# Patient Record
Sex: FEMALE | Race: WHITE | NOT HISPANIC OR LATINO | URBAN - METROPOLITAN AREA
[De-identification: names, ages, dates, MRNs, and addresses within clinical notes are randomized per-mention and may not be internally consistent; named-entity substitution may affect disease eponyms.]

---

## 2018-01-29 ENCOUNTER — EMERGENCY (EMERGENCY)
Facility: HOSPITAL | Age: 78
LOS: 0 days | Discharge: HOME | End: 2018-01-29
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
## 2018-01-29 DIAGNOSIS — Z79.899 OTHER LONG TERM (CURRENT) DRUG THERAPY: ICD-10-CM

## 2018-01-29 DIAGNOSIS — S80.02XA CONTUSION OF LEFT KNEE, INITIAL ENCOUNTER: ICD-10-CM

## 2018-01-29 DIAGNOSIS — S81.011A LACERATION WITHOUT FOREIGN BODY, RIGHT KNEE, INITIAL ENCOUNTER: ICD-10-CM

## 2018-01-29 DIAGNOSIS — Z88.0 ALLERGY STATUS TO PENICILLIN: ICD-10-CM

## 2018-01-29 DIAGNOSIS — I10 ESSENTIAL (PRIMARY) HYPERTENSION: ICD-10-CM

## 2018-01-29 DIAGNOSIS — L03.119 CELLULITIS OF UNSPECIFIED PART OF LIMB: ICD-10-CM
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
## 2018-01-29 DIAGNOSIS — S40.012A CONTUSION OF LEFT SHOULDER, INITIAL ENCOUNTER: ICD-10-CM

## 2018-01-29 DIAGNOSIS — Z23 ENCOUNTER FOR IMMUNIZATION: ICD-10-CM

## 2018-01-29 DIAGNOSIS — S49.92XA UNSPECIFIED INJURY OF LEFT SHOULDER AND UPPER ARM, INITIAL ENCOUNTER: ICD-10-CM

## 2018-01-29 DIAGNOSIS — Z96.653 PRESENCE OF ARTIFICIAL KNEE JOINT, BILATERAL: ICD-10-CM
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
## 2018-09-11 ENCOUNTER — EMERGENCY (EMERGENCY)
Facility: HOSPITAL | Age: 78
LOS: 0 days | Discharge: HOME | End: 2018-09-11
Attending: EMERGENCY MEDICINE | Admitting: EMERGENCY MEDICINE

## 2018-09-11 VITALS
HEART RATE: 95 BPM | RESPIRATION RATE: 20 BRPM | OXYGEN SATURATION: 96 % | TEMPERATURE: 96 F | SYSTOLIC BLOOD PRESSURE: 146 MMHG | DIASTOLIC BLOOD PRESSURE: 79 MMHG

## 2018-09-11 DIAGNOSIS — F03.90 UNSPECIFIED DEMENTIA WITHOUT BEHAVIORAL DISTURBANCE: ICD-10-CM

## 2018-09-11 DIAGNOSIS — Z96.659 PRESENCE OF UNSPECIFIED ARTIFICIAL KNEE JOINT: ICD-10-CM

## 2018-09-11 DIAGNOSIS — I50.9 HEART FAILURE, UNSPECIFIED: ICD-10-CM

## 2018-09-11 DIAGNOSIS — I11.0 HYPERTENSIVE HEART DISEASE WITH HEART FAILURE: ICD-10-CM

## 2018-09-11 DIAGNOSIS — N39.0 URINARY TRACT INFECTION, SITE NOT SPECIFIED: ICD-10-CM

## 2018-09-11 DIAGNOSIS — Z96.649 PRESENCE OF UNSPECIFIED ARTIFICIAL HIP JOINT: ICD-10-CM

## 2018-09-11 DIAGNOSIS — Z88.0 ALLERGY STATUS TO PENICILLIN: ICD-10-CM

## 2018-09-11 LAB
ALBUMIN SERPL ELPH-MCNC: 4.7 G/DL — SIGNIFICANT CHANGE UP (ref 3.5–5.2)
ALP SERPL-CCNC: 62 U/L — SIGNIFICANT CHANGE UP (ref 30–115)
ALT FLD-CCNC: 8 U/L — SIGNIFICANT CHANGE UP (ref 0–41)
ANION GAP SERPL CALC-SCNC: 15 MMOL/L — HIGH (ref 7–14)
APPEARANCE UR: CLEAR — SIGNIFICANT CHANGE UP
AST SERPL-CCNC: 20 U/L — SIGNIFICANT CHANGE UP (ref 0–41)
BASOPHILS # BLD AUTO: 0.02 K/UL — SIGNIFICANT CHANGE UP (ref 0–0.2)
BASOPHILS NFR BLD AUTO: 0.2 % — SIGNIFICANT CHANGE UP (ref 0–1)
BILIRUB SERPL-MCNC: 1.6 MG/DL — HIGH (ref 0.2–1.2)
BILIRUB UR-MCNC: NEGATIVE — SIGNIFICANT CHANGE UP
BUN SERPL-MCNC: 18 MG/DL — SIGNIFICANT CHANGE UP (ref 10–20)
CALCIUM SERPL-MCNC: 9.9 MG/DL — SIGNIFICANT CHANGE UP (ref 8.5–10.1)
CHLORIDE SERPL-SCNC: 94 MMOL/L — LOW (ref 98–110)
CO2 SERPL-SCNC: 30 MMOL/L — SIGNIFICANT CHANGE UP (ref 17–32)
COLOR SPEC: YELLOW — SIGNIFICANT CHANGE UP
CREAT SERPL-MCNC: 0.9 MG/DL — SIGNIFICANT CHANGE UP (ref 0.7–1.5)
DIFF PNL FLD: NEGATIVE — SIGNIFICANT CHANGE UP
EOSINOPHIL # BLD AUTO: 0.09 K/UL — SIGNIFICANT CHANGE UP (ref 0–0.7)
EOSINOPHIL NFR BLD AUTO: 1 % — SIGNIFICANT CHANGE UP (ref 0–8)
GLUCOSE SERPL-MCNC: 115 MG/DL — HIGH (ref 70–99)
GLUCOSE UR QL: NEGATIVE MG/DL — SIGNIFICANT CHANGE UP
HCT VFR BLD CALC: 42 % — SIGNIFICANT CHANGE UP (ref 37–47)
HGB BLD-MCNC: 13.5 G/DL — SIGNIFICANT CHANGE UP (ref 12–16)
IMM GRANULOCYTES NFR BLD AUTO: 0.2 % — SIGNIFICANT CHANGE UP (ref 0.1–0.3)
KETONES UR-MCNC: NEGATIVE — SIGNIFICANT CHANGE UP
LACTATE SERPL-SCNC: 1.1 MMOL/L — SIGNIFICANT CHANGE UP (ref 0.5–2.2)
LEUKOCYTE ESTERASE UR-ACNC: ABNORMAL
LYMPHOCYTES # BLD AUTO: 0.84 K/UL — LOW (ref 1.2–3.4)
LYMPHOCYTES # BLD AUTO: 9.5 % — LOW (ref 20.5–51.1)
MCHC RBC-ENTMCNC: 27.4 PG — SIGNIFICANT CHANGE UP (ref 27–31)
MCHC RBC-ENTMCNC: 32.1 G/DL — SIGNIFICANT CHANGE UP (ref 32–37)
MCV RBC AUTO: 85.4 FL — SIGNIFICANT CHANGE UP (ref 81–99)
MONOCYTES # BLD AUTO: 0.47 K/UL — SIGNIFICANT CHANGE UP (ref 0.1–0.6)
MONOCYTES NFR BLD AUTO: 5.3 % — SIGNIFICANT CHANGE UP (ref 1.7–9.3)
NEUTROPHILS # BLD AUTO: 7.4 K/UL — HIGH (ref 1.4–6.5)
NEUTROPHILS NFR BLD AUTO: 83.8 % — HIGH (ref 42.2–75.2)
NITRITE UR-MCNC: NEGATIVE — SIGNIFICANT CHANGE UP
PH UR: 7.5 — SIGNIFICANT CHANGE UP (ref 5–8)
PLATELET # BLD AUTO: 211 K/UL — SIGNIFICANT CHANGE UP (ref 130–400)
POTASSIUM SERPL-MCNC: 3.6 MMOL/L — SIGNIFICANT CHANGE UP (ref 3.5–5)
POTASSIUM SERPL-SCNC: 3.6 MMOL/L — SIGNIFICANT CHANGE UP (ref 3.5–5)
PROT SERPL-MCNC: 8 G/DL — SIGNIFICANT CHANGE UP (ref 6–8)
PROT UR-MCNC: NEGATIVE MG/DL — SIGNIFICANT CHANGE UP
RBC # BLD: 4.92 M/UL — SIGNIFICANT CHANGE UP (ref 4.2–5.4)
RBC # FLD: 13.2 % — SIGNIFICANT CHANGE UP (ref 11.5–14.5)
SODIUM SERPL-SCNC: 139 MMOL/L — SIGNIFICANT CHANGE UP (ref 135–146)
SP GR SPEC: 1.01 — SIGNIFICANT CHANGE UP (ref 1.01–1.03)
UROBILINOGEN FLD QL: 1 MG/DL (ref 0.2–0.2)
WBC # BLD: 8.84 K/UL — SIGNIFICANT CHANGE UP (ref 4.8–10.8)
WBC # FLD AUTO: 8.84 K/UL — SIGNIFICANT CHANGE UP (ref 4.8–10.8)

## 2018-09-11 RX ORDER — NITROFURANTOIN MACROCRYSTAL 50 MG
1 CAPSULE ORAL
Qty: 14 | Refills: 0 | OUTPATIENT
Start: 2018-09-11 | End: 2018-09-17

## 2018-09-11 RX ORDER — HALOPERIDOL DECANOATE 100 MG/ML
1 INJECTION INTRAMUSCULAR
Qty: 30 | Refills: 0 | OUTPATIENT
Start: 2018-09-11 | End: 2018-10-10

## 2018-09-11 RX ORDER — ACETAMINOPHEN 500 MG
650 TABLET ORAL ONCE
Qty: 0 | Refills: 0 | Status: COMPLETED | OUTPATIENT
Start: 2018-09-11 | End: 2018-09-11

## 2018-09-11 RX ADMIN — Medication 650 MILLIGRAM(S): at 11:24

## 2018-09-11 NOTE — ED PROVIDER NOTE - MEDICAL DECISION MAKING DETAILS
76 yo F PMH HTN, CHF, hip and knee replacement, dementia recently diagnosed in may and recent cellulitis that she recently finished clindamycin course for. Lives at home with her sister who states that last night she was saying agitated and having delusions. Was diagnosed with dementia by PMD Dr. Maurice in may but sister states that it has been getting progressively worse. States that she did not have a CT scan when originally diagnosed. Labs sent and patient may have UTI.  Attempted to get CT scan but patient refused despite multiple attempts.  She is capable of making her decisions and she is alert and can verbalize understanding and reasons for refusal. Patient to follow up as an outpatient. Sister spoken to in detail and will start medications at night. Will treat UTI as well.

## 2018-09-11 NOTE — ED PROVIDER NOTE - OBJECTIVE STATEMENT
76 yo F pmh of HTN, CHF, hip and knee replacement, dementia recently diagnosed in may and recent cellulitis that she is on clindamycin for presents with confusion. LIves at home with her sister who states that last night she was saying that she was in [place] when she wasn't and that people were in the house that were not there. Patient denies any visual or auditory hallucinations. no recent fever or illness. Currently on clindamycin x 3 weeks for cellulitis in [facility]. no headache, no dizziness, no cp, no sob, no n/v/d. Was diagnosed with dementia by pmd Dr. Maurice in may but sister states that it has been getting progressively worse. States that she did not have a CT scan when originally diagnosed.

## 2018-09-11 NOTE — ED PROVIDER NOTE - PROGRESS NOTE DETAILS
Patient brought over to CT scan for CT head but due to back pain could not tolerate getting the CT done. Patient refusing scan at this time. Will give tylenol and rediscuss imaging when pain improves.

## 2018-09-11 NOTE — ED PROVIDER NOTE - PHYSICAL EXAMINATION
CONSTITUTIONAL: Well-developed; well-nourished; in no acute distress.   SKIN: warm, dry  HEAD: Normocephalic; atraumatic.  EYES: PERRL, EOMI, no conjunctival erythema  ENT: No nasal discharge; airway clear.  NECK: Supple; non tender.  CARD: S1, S2 normal; . Regular rate and rhythm.   RESP: No wheezes, rales or rhonchi.  ABD: soft ntnd  EXT: Normal ROM. no pedal edema. no warmth, redness or edema to bl lower extremities.   LYMPH: No acute cervical adenopathy.  NEURO: Alert, oriented, grossly unremarkable. neurovascularly intact  PSYCH: Cooperative, appropriate. no visual or auditory hallucinations

## 2018-09-11 NOTE — ED PROVIDER NOTE - NS ED ROS FT
Eyes:  No visual changes, eye pain or discharge.  ENMT:  no sore throat or runny nose  Cardiac:  No chest pain, SOB  Respiratory:  No cough or respiratory distress.   GI:  No nausea, vomiting, diarrhea or abdominal pain.  :  No dysuria, frequency or burning.  MS:  No joint pain or back pain.  Neuro:  No headache or weakness.  No LOC.  Skin:  No skin rash.   Endocrine: No history of thyroid disease or diabetes.

## 2018-09-11 NOTE — ED PROVIDER NOTE - CARE PLAN
Principal Discharge DX:	Dementia with behavioral disturbance, unspecified dementia type  Secondary Diagnosis:	Urinary tract infection without hematuria, site unspecified

## 2018-09-11 NOTE — ED ADULT TRIAGE NOTE - CHIEF COMPLAINT QUOTE
Brought in by sister for worsening dementia symptoms. Was diagnosed in May with dementia. Patient was with niece at home this morning who heard patient talking. Patient denies hearing or talking to people. Patient only complains of some chills, no n/v fever or any other symptoms.

## 2018-09-11 NOTE — ED ADULT NURSE NOTE - OBJECTIVE STATEMENT
Brought in by sister for worsening dementia symptoms. pts sister reports "she was talking to people that werent there"

## 2018-09-11 NOTE — ED PROVIDER NOTE - ATTENDING CONTRIBUTION TO CARE
I personally evaluated patient. I agree with the findings and plan with all documentation on chart except as documented  in my note.    76 yo F PMH HTN, CHF, hip and knee replacement, dementia recently diagnosed in may and recent cellulitis that she recently finished clindamycin course for. Lives at home with her sister who states that last night she was saying agitated and having delusions. Was diagnosed with dementia by PMD Dr. Maurice in may but sister states that it has been getting progressively worse. States that she did not have a CT scan when originally diagnosed. Labs sent and patient may have UTI.  Attempted to get CT scan but patient refused despite multiple attempts.  She is capable of making her decisions and she is alert and can verbalize understanding and reasons for refusal. Patient to follow up as an outpatient. Sister spoken to in detail and will start medications at night. Will treat UTI as well.    Full DC instructions discussed and patient knows when to seek immediate medical attention.  Patient has proper follow up.  All results discussed and patient aware they may require further work up.  Proper follow up ensured. Limitations of ED work up discussed.  Medications administered and prescribed/OTC home meds discussed.  All questions and concerns from patient or family addressed. Understanding of instructions verbalized.

## 2018-09-12 LAB
CULTURE RESULTS: NO GROWTH — SIGNIFICANT CHANGE UP
SPECIMEN SOURCE: SIGNIFICANT CHANGE UP

## 2018-09-23 ENCOUNTER — INPATIENT (INPATIENT)
Facility: HOSPITAL | Age: 78
LOS: 3 days | Discharge: NURSING, REHAB OR LONG-TERM CARE | End: 2018-09-27
Attending: HOSPITALIST | Admitting: HOSPITALIST

## 2018-09-23 VITALS
TEMPERATURE: 97 F | HEART RATE: 73 BPM | SYSTOLIC BLOOD PRESSURE: 160 MMHG | WEIGHT: 149.91 LBS | DIASTOLIC BLOOD PRESSURE: 78 MMHG | RESPIRATION RATE: 20 BRPM | OXYGEN SATURATION: 97 %

## 2018-09-23 LAB
ALBUMIN SERPL ELPH-MCNC: 4.2 G/DL — SIGNIFICANT CHANGE UP (ref 3.5–5.2)
ALP SERPL-CCNC: 50 U/L — SIGNIFICANT CHANGE UP (ref 30–115)
ALT FLD-CCNC: 16 U/L — SIGNIFICANT CHANGE UP (ref 0–41)
ANION GAP SERPL CALC-SCNC: 15 MMOL/L — HIGH (ref 7–14)
APPEARANCE UR: ABNORMAL
AST SERPL-CCNC: 33 U/L — SIGNIFICANT CHANGE UP (ref 0–41)
BACTERIA # UR AUTO: ABNORMAL
BASOPHILS # BLD AUTO: 0.02 K/UL — SIGNIFICANT CHANGE UP (ref 0–0.2)
BASOPHILS NFR BLD AUTO: 0.3 % — SIGNIFICANT CHANGE UP (ref 0–1)
BILIRUB SERPL-MCNC: 1.7 MG/DL — HIGH (ref 0.2–1.2)
BILIRUB UR-MCNC: NEGATIVE — SIGNIFICANT CHANGE UP
BUN SERPL-MCNC: 22 MG/DL — HIGH (ref 10–20)
CALCIUM SERPL-MCNC: 9.7 MG/DL — SIGNIFICANT CHANGE UP (ref 8.5–10.1)
CHLORIDE SERPL-SCNC: 100 MMOL/L — SIGNIFICANT CHANGE UP (ref 98–110)
CO2 SERPL-SCNC: 26 MMOL/L — SIGNIFICANT CHANGE UP (ref 17–32)
COLOR SPEC: YELLOW — SIGNIFICANT CHANGE UP
CREAT SERPL-MCNC: 1.1 MG/DL — SIGNIFICANT CHANGE UP (ref 0.7–1.5)
DIFF PNL FLD: ABNORMAL
EOSINOPHIL # BLD AUTO: 0.1 K/UL — SIGNIFICANT CHANGE UP (ref 0–0.7)
EOSINOPHIL NFR BLD AUTO: 1.6 % — SIGNIFICANT CHANGE UP (ref 0–8)
EPI CELLS # UR: ABNORMAL /HPF
GLUCOSE SERPL-MCNC: 93 MG/DL — SIGNIFICANT CHANGE UP (ref 70–99)
GLUCOSE UR QL: NEGATIVE MG/DL — SIGNIFICANT CHANGE UP
HCT VFR BLD CALC: 39 % — SIGNIFICANT CHANGE UP (ref 37–47)
HGB BLD-MCNC: 12.5 G/DL — SIGNIFICANT CHANGE UP (ref 12–16)
IMM GRANULOCYTES NFR BLD AUTO: 0.2 % — SIGNIFICANT CHANGE UP (ref 0.1–0.3)
KETONES UR-MCNC: NEGATIVE — SIGNIFICANT CHANGE UP
LACTATE SERPL-SCNC: 1.2 MMOL/L — SIGNIFICANT CHANGE UP (ref 0.5–2.2)
LEUKOCYTE ESTERASE UR-ACNC: ABNORMAL
LYMPHOCYTES # BLD AUTO: 1.07 K/UL — LOW (ref 1.2–3.4)
LYMPHOCYTES # BLD AUTO: 17.3 % — LOW (ref 20.5–51.1)
MCHC RBC-ENTMCNC: 27.4 PG — SIGNIFICANT CHANGE UP (ref 27–31)
MCHC RBC-ENTMCNC: 32.1 G/DL — SIGNIFICANT CHANGE UP (ref 32–37)
MCV RBC AUTO: 85.5 FL — SIGNIFICANT CHANGE UP (ref 81–99)
MONOCYTES # BLD AUTO: 0.45 K/UL — SIGNIFICANT CHANGE UP (ref 0.1–0.6)
MONOCYTES NFR BLD AUTO: 7.3 % — SIGNIFICANT CHANGE UP (ref 1.7–9.3)
NEUTROPHILS # BLD AUTO: 4.54 K/UL — SIGNIFICANT CHANGE UP (ref 1.4–6.5)
NEUTROPHILS NFR BLD AUTO: 73.3 % — SIGNIFICANT CHANGE UP (ref 42.2–75.2)
NITRITE UR-MCNC: NEGATIVE — SIGNIFICANT CHANGE UP
NRBC # BLD: 0 /100 WBCS — SIGNIFICANT CHANGE UP (ref 0–0)
PH UR: 7 — SIGNIFICANT CHANGE UP (ref 5–8)
PLATELET # BLD AUTO: 184 K/UL — SIGNIFICANT CHANGE UP (ref 130–400)
POTASSIUM SERPL-MCNC: 3.5 MMOL/L — SIGNIFICANT CHANGE UP (ref 3.5–5)
POTASSIUM SERPL-SCNC: 3.5 MMOL/L — SIGNIFICANT CHANGE UP (ref 3.5–5)
PROT SERPL-MCNC: 6.8 G/DL — SIGNIFICANT CHANGE UP (ref 6–8)
PROT UR-MCNC: NEGATIVE MG/DL — SIGNIFICANT CHANGE UP
RBC # BLD: 4.56 M/UL — SIGNIFICANT CHANGE UP (ref 4.2–5.4)
RBC # FLD: 13.2 % — SIGNIFICANT CHANGE UP (ref 11.5–14.5)
RBC CASTS # UR COMP ASSIST: ABNORMAL /HPF
SODIUM SERPL-SCNC: 141 MMOL/L — SIGNIFICANT CHANGE UP (ref 135–146)
SP GR SPEC: 1.02 — SIGNIFICANT CHANGE UP (ref 1.01–1.03)
TROPONIN T SERPL-MCNC: <0.01 NG/ML — SIGNIFICANT CHANGE UP
UROBILINOGEN FLD QL: 0.2 MG/DL — SIGNIFICANT CHANGE UP (ref 0.2–0.2)
WBC # BLD: 6.19 K/UL — SIGNIFICANT CHANGE UP (ref 4.8–10.8)
WBC # FLD AUTO: 6.19 K/UL — SIGNIFICANT CHANGE UP (ref 4.8–10.8)
WBC UR QL: >50 /HPF

## 2018-09-23 RX ORDER — HEPARIN SODIUM 5000 [USP'U]/ML
5000 INJECTION INTRAVENOUS; SUBCUTANEOUS EVERY 8 HOURS
Qty: 0 | Refills: 0 | Status: DISCONTINUED | OUTPATIENT
Start: 2018-09-23 | End: 2018-09-27

## 2018-09-23 RX ORDER — HALOPERIDOL DECANOATE 100 MG/ML
2.5 INJECTION INTRAMUSCULAR ONCE
Qty: 0 | Refills: 0 | Status: COMPLETED | OUTPATIENT
Start: 2018-09-23 | End: 2018-09-23

## 2018-09-23 RX ADMIN — Medication 202 MILLIGRAM(S): at 21:16

## 2018-09-23 RX ADMIN — HALOPERIDOL DECANOATE 2.5 MILLIGRAM(S): 100 INJECTION INTRAMUSCULAR at 18:07

## 2018-09-23 RX ADMIN — HALOPERIDOL DECANOATE 2.5 MILLIGRAM(S): 100 INJECTION INTRAMUSCULAR at 19:09

## 2018-09-23 RX ADMIN — HALOPERIDOL DECANOATE 2.5 MILLIGRAM(S): 100 INJECTION INTRAMUSCULAR at 20:27

## 2018-09-23 NOTE — ED PROVIDER NOTE - PROGRESS NOTE DETAILS
Spoke with hospitalist (Derrell) regarding patient's negative labs and CT scan. Will admit to low risk tele PA fellow note reviewed and agree, patient becomes combative, delusional in ED at times, Patient given sedation to control mood. Admitted to medicine for  pysch eval and possible NH placement
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
## 2018-09-23 NOTE — H&P ADULT - NSHPLABSRESULTS_GEN_ALL_CORE
< from: CT Head No Cont (18 @ 21:39) >    EXAM:  CT BRAIN          PROCEDURE DATE:  2018      IMPRESSION:     Limited study secondary to patient motion.    No definite evidence of acute intracranial hemorrhage, mass effect or   midline shift.      ESA NAVA M.D., RESIDENT RADIOLOGIST  This document has been electronically signed.  DELIO DE LA ROSA M.D., ATTENDING RADIOLOGIST  Thisdocument has been electronically signed. Sep 23 2018 10:28PM      < end of copied text >                          12.5   6.19  )-----------( 184      ( 23 Sep 2018 17:30 )             39.0         141  |  100  |  22<H>  ----------------------------<  93  3.5   |  26  |  1.1    Ca    9.7      23 Sep 2018 17:30    TPro  6.8  /  Alb  4.2  /  TBili  1.7<H>  /  DBili  x   /  AST  33  /  ALT  16  /  AlkPhos  50            Urinalysis Basic - ( 23 Sep 2018 21:44 )    Color: Yellow / Appearance: Slightly Cloudy / S.020 / pH: x  Gluc: x / Ketone: Negative  / Bili: Negative / Urobili: 0.2 mg/dL   Blood: x / Protein: Negative mg/dL / Nitrite: Negative   Leuk Esterase: Moderate / RBC: 2-5 /HPF / WBC >50 /HPF   Sq Epi: x / Non Sq Epi: Many /HPF / Bacteria: Moderate        Lactate Trend   @ 17:30 Lactate:1.2     CARDIAC MARKERS ( 23 Sep 2018 17:30 )  x     / <0.01 ng/mL / x     / x     / x          CAPILLARY BLOOD GLUCOSE        Culture Results:   No growth ( @ 08:28) < from: CT Head No Cont (18 @ 21:39) >    EXAM:  CT BRAIN          PROCEDURE DATE:  2018      IMPRESSION:     Limited study secondary to patient motion.    No definite evidence of acute intracranial hemorrhage, mass effect or   midline shift.      ESA NAVA M.D., RESIDENT RADIOLOGIST  This document has been electronically signed.  DELIO DE LA ROSA M.D., ATTENDING RADIOLOGIST  Thisdocument has been electronically signed. Sep 23 2018 10:28PM      < end of copied text >                          12.5   6.19  )-----------( 184      ( 23 Sep 2018 17:30 )             39.0         141  |  100  |  22<H>  ----------------------------<  93  3.5   |  26  |  1.1    Ca    9.7      23 Sep 2018 17:30    TPro  6.8  /  Alb  4.2  /  TBili  1.7<H>  /  DBili  x   /  AST  33  /  ALT  16  /  AlkPhos  50            Urinalysis Basic - ( 23 Sep 2018 21:44 )    Color: Yellow / Appearance: Slightly Cloudy / S.020 / pH: x  Gluc: x / Ketone: Negative  / Bili: Negative / Urobili: 0.2 mg/dL   Blood: x / Protein: Negative mg/dL / Nitrite: Negative   Leuk Esterase: Moderate / RBC: 2-5 /HPF / WBC >50 /HPF   Sq Epi: x / Non Sq Epi: Many /HPF / Bacteria: Moderate        Lactate Trend   @ 17:30 Lactate:1.2     CARDIAC MARKERS ( 23 Sep 2018 17:30 )  x     / <0.01 ng/mL / x     / x     / x          CAPILLARY BLOOD GLUCOSE        Culture Results:   No growth ( @ 08:28)    ekg - sinus, pvc, lafb, prwp

## 2018-09-23 NOTE — ED PROVIDER NOTE - PHYSICAL EXAMINATION
Physical Exam    Vital Signs: I have reviewed the initial vital signs.  Constitutional: well-nourished, appears stated age, no acute distress lying on hospital bed.  Eyes: Conjunctiva pink, Sclera clear, PERRLA, EOMI.  ENT: Mucous membranes moist, no lesions noted  Cardiovascular: S1 and S2, regular rate, regular rhythm, well-perfused extremities, radial pulses equal and 2+, Dp 2 +  Respiratory: unlabored respiratory effort, clear to auscultation bilaterally no wheezing, rales and rhonchi  Gastrointestinal: soft, non-tender abdomen, no pulsatile mass, normal bowl sounds  Musculoskeletal: supple neck, no lower extremity edema, no midline tenderness  Integumentary: warm, dry, no rash  Neurologic: alert to person and time. Patient able to ambulated, normal gait. Good strength in upper and lower extremities  Psychiatric: appropriate mood, appropriate affect Physical Exam    Vital Signs: I have reviewed the initial vital signs.  Constitutional: well-nourished, appears stated age, no acute distress lying on hospital bed.  Eyes: Conjunctiva pink, Sclera clear, PERRLA, EOMI.  ENT: Mucous membranes moist, no lesions noted  Cardiovascular: S1 and S2, regular rate, regular rhythm, well-perfused extremities, radial pulses equal and 2+, Dp 2 +  Respiratory: unlabored respiratory effort, clear to auscultation bilaterally no wheezing, rales and rhonchi  Gastrointestinal: soft, non-tender abdomen, no pulsatile mass, normal bowl sounds  Musculoskeletal: supple neck, no lower extremity edema, no midline tenderness  Integumentary: warm, dry, no rash  Neurologic: alert to person . Patient able to ambulated, normal gait. Good strength in upper and lower extremities  Psychiatric: appropriate mood, appropriate on initial eval.  affect at times become confused, delusional in ED, combative with staff. requiring sedation to keep patient from injuring  herself and staff

## 2018-09-23 NOTE — ED ADULT NURSE NOTE - NSIMPLEMENTINTERV_GEN_ALL_ED
Implemented All Fall Risk Interventions:  [place] to call system. Call bell, personal items and telephone within reach. Instruct patient to call for assistance. Room bathroom lighting operational. Non-slip footwear when patient is off stretcher. Physically safe environment: no spills, clutter or unnecessary equipment. Stretcher in lowest position, wheels locked, appropriate side rails in place. Provide visual cue, wrist band, yellow gown, etc. Monitor gait and stability. Monitor for mental status changes and reorient to person, place, and time. Review medications for side effects contributing to fall risk. Reinforce activity limits and safety measures with patient and family.

## 2018-09-23 NOTE — H&P ADULT - NSHPREVIEWOFSYSTEMS_GEN_ALL_CORE
unable to obtain besides PMH and HPI unable to obtain besides PMH and HPI and she is wearing glasses
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
## 2018-09-23 NOTE — H&P ADULT - FAMILY HISTORY
Sibling  Still living? Yes, Estimated age: 71-80  Family history of breast cancer in sister, Age at diagnosis: Age Unknown

## 2018-09-24 DIAGNOSIS — L53.9 ERYTHEMATOUS CONDITION, UNSPECIFIED: ICD-10-CM

## 2018-09-24 DIAGNOSIS — Z98.890 OTHER SPECIFIED POSTPROCEDURAL STATES: Chronic | ICD-10-CM

## 2018-09-24 DIAGNOSIS — S79.919A UNSPECIFIED INJURY OF UNSPECIFIED HIP, INITIAL ENCOUNTER: Chronic | ICD-10-CM

## 2018-09-24 DIAGNOSIS — F03.91 UNSPECIFIED DEMENTIA WITH BEHAVIORAL DISTURBANCE: ICD-10-CM

## 2018-09-24 DIAGNOSIS — F03.90 UNSPECIFIED DEMENTIA WITHOUT BEHAVIORAL DISTURBANCE: ICD-10-CM

## 2018-09-24 DIAGNOSIS — I10 ESSENTIAL (PRIMARY) HYPERTENSION: ICD-10-CM

## 2018-09-24 RX ORDER — HALOPERIDOL DECANOATE 100 MG/ML
1 INJECTION INTRAMUSCULAR AT BEDTIME
Qty: 0 | Refills: 0 | Status: DISCONTINUED | OUTPATIENT
Start: 2018-09-24 | End: 2018-09-25

## 2018-09-24 RX ORDER — LISINOPRIL 2.5 MG/1
10 TABLET ORAL DAILY
Qty: 0 | Refills: 0 | Status: DISCONTINUED | OUTPATIENT
Start: 2018-09-24 | End: 2018-09-27

## 2018-09-24 RX ORDER — INFLUENZA VIRUS VACCINE 15; 15; 15; 15 UG/.5ML; UG/.5ML; UG/.5ML; UG/.5ML
0.5 SUSPENSION INTRAMUSCULAR ONCE
Qty: 0 | Refills: 0 | Status: DISCONTINUED | OUTPATIENT
Start: 2018-09-24 | End: 2018-09-27

## 2018-09-24 RX ORDER — HYDROCHLOROTHIAZIDE 25 MG
12.5 TABLET ORAL DAILY
Qty: 0 | Refills: 0 | Status: DISCONTINUED | OUTPATIENT
Start: 2018-09-24 | End: 2018-09-27

## 2018-09-24 RX ADMIN — HEPARIN SODIUM 5000 UNIT(S): 5000 INJECTION INTRAVENOUS; SUBCUTANEOUS at 06:41

## 2018-09-24 RX ADMIN — LISINOPRIL 10 MILLIGRAM(S): 2.5 TABLET ORAL at 06:41

## 2018-09-24 RX ADMIN — HEPARIN SODIUM 5000 UNIT(S): 5000 INJECTION INTRAVENOUS; SUBCUTANEOUS at 14:20

## 2018-09-24 RX ADMIN — Medication 12.5 MILLIGRAM(S): at 06:41

## 2018-09-24 RX ADMIN — HEPARIN SODIUM 5000 UNIT(S): 5000 INJECTION INTRAVENOUS; SUBCUTANEOUS at 21:06

## 2018-09-24 NOTE — BEHAVIORAL HEALTH ASSESSMENT NOTE - NSBHCONSULTRECOMMENDOTHER_PSY_A_CORE FT
Family describes precipitous change in presentation approx 5 mos ago, which is atypical of dementia which typically has a slow progression- history concerning for delerium on dementia
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
## 2018-09-24 NOTE — BEHAVIORAL HEALTH ASSESSMENT NOTE - NSBHSOCIALHXDETAILSFT_PSY_A_CORE
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
## 2018-09-25 RX ORDER — HALOPERIDOL DECANOATE 100 MG/ML
0.5 INJECTION INTRAMUSCULAR
Qty: 0 | Refills: 0 | Status: DISCONTINUED | OUTPATIENT
Start: 2018-09-25 | End: 2018-09-27

## 2018-09-25 RX ADMIN — HEPARIN SODIUM 5000 UNIT(S): 5000 INJECTION INTRAVENOUS; SUBCUTANEOUS at 05:30

## 2018-09-25 RX ADMIN — Medication 12.5 MILLIGRAM(S): at 05:30

## 2018-09-25 RX ADMIN — HEPARIN SODIUM 5000 UNIT(S): 5000 INJECTION INTRAVENOUS; SUBCUTANEOUS at 13:48

## 2018-09-25 RX ADMIN — LISINOPRIL 10 MILLIGRAM(S): 2.5 TABLET ORAL at 05:30

## 2018-09-25 RX ADMIN — HEPARIN SODIUM 5000 UNIT(S): 5000 INJECTION INTRAVENOUS; SUBCUTANEOUS at 21:07

## 2018-09-26 ENCOUNTER — TRANSCRIPTION ENCOUNTER (OUTPATIENT)
Age: 78
End: 2018-09-26

## 2018-09-26 RX ORDER — RAMIPRIL 5 MG
0 CAPSULE ORAL
Qty: 0 | Refills: 0 | COMMUNITY

## 2018-09-26 RX ORDER — HYDROCHLOROTHIAZIDE 25 MG
0 TABLET ORAL
Qty: 0 | Refills: 0 | COMMUNITY

## 2018-09-26 RX ORDER — HALOPERIDOL DECANOATE 100 MG/ML
1 INJECTION INTRAMUSCULAR
Qty: 0 | Refills: 0 | COMMUNITY
Start: 2018-09-26

## 2018-09-26 RX ORDER — LISINOPRIL 2.5 MG/1
1 TABLET ORAL
Qty: 0 | Refills: 0 | COMMUNITY
Start: 2018-09-26

## 2018-09-26 RX ADMIN — HALOPERIDOL DECANOATE 0.5 MILLIGRAM(S): 100 INJECTION INTRAMUSCULAR at 11:16

## 2018-09-26 RX ADMIN — HEPARIN SODIUM 5000 UNIT(S): 5000 INJECTION INTRAVENOUS; SUBCUTANEOUS at 21:08

## 2018-09-26 RX ADMIN — HEPARIN SODIUM 5000 UNIT(S): 5000 INJECTION INTRAVENOUS; SUBCUTANEOUS at 15:45

## 2018-09-26 NOTE — DISCHARGE NOTE ADULT - CARE PLAN
Principal Discharge DX:	Dementia with behavioral disturbance, unspecified dementia type  Goal:	manage behavior  Assessment and plan of treatment:	take medication as prescribed  Secondary Diagnosis:	Essential hypertension  Assessment and plan of treatment:	continue hctz and lisinopril

## 2018-09-26 NOTE — DISCHARGE NOTE ADULT - PATIENT PORTAL LINK FT
You can access the [facility], offered by [facility], by registering with the following website: [email or web address]
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
## 2018-09-26 NOTE — DISCHARGE NOTE ADULT - MEDICATION SUMMARY - MEDICATIONS TO TAKE
I will START or STAY ON the medications listed below when I get home from the hospital:    lisinopril 10 mg oral tablet  -- 1 tab(s) by mouth once a day  -- Indication: For Hypertension    haloperidol 0.5 mg oral tablet  -- 1 tab(s) by mouth 2 times a day, As needed, anxiety  -- Indication: For DEMENTIA    hydroCHLOROthiazide 12.5 mg oral capsule  -- 1 cap(s) by mouth once a day  -- Indication: For Hypertension

## 2018-09-27 VITALS
SYSTOLIC BLOOD PRESSURE: 140 MMHG | DIASTOLIC BLOOD PRESSURE: 66 MMHG | RESPIRATION RATE: 16 BRPM | TEMPERATURE: 97 F | HEART RATE: 80 BPM

## 2018-09-27 RX ADMIN — Medication 12.5 MILLIGRAM(S): at 06:02

## 2018-09-27 RX ADMIN — HEPARIN SODIUM 5000 UNIT(S): 5000 INJECTION INTRAVENOUS; SUBCUTANEOUS at 06:02

## 2018-09-27 RX ADMIN — LISINOPRIL 10 MILLIGRAM(S): 2.5 TABLET ORAL at 06:02

## 2018-09-27 NOTE — PROGRESS NOTE ADULT - REASON FOR ADMISSION
agitation and confusion

## 2018-09-27 NOTE — PROGRESS NOTE ADULT - PROBLEM SELECTOR PLAN 2
monitor blood pressure, currently controlled
monitor blood pressure, currently controlled
monitor blood pressure, currently controlled on lisinopril and hctz
monitor blood pressure, currently controlled on lisinopril and hctz

## 2018-09-27 NOTE — PROGRESS NOTE ADULT - SUBJECTIVE AND OBJECTIVE BOX
RAGINI MARCUS  77y  [facility] [address] [place] [address]      Patient is a 77y old  Female who presents with a chief complaint of agitation and confusion (23 Sep 2018 22:33)      INTERVAL HPI/OVERNIGHT EVENTS:  No events        REVIEW OF SYSTEMS:  denies headache , nausea, vomiting chest pain, shortness of breath, abdominal pain, weakness, loss in appetite vision loss,  FAMILY HISTORY:  Family history of breast cancer in sister (Sibling)    Vital Signs Last 24 Hrs  T(C): 36.1 (25 Sep 2018 05:18), Max: 36.1 (25 Sep 2018 05:18)  T(F): 96.9 (25 Sep 2018 05:18), Max: 96.9 (25 Sep 2018 05:18)  HR: 92 (25 Sep 2018 05:18) (81 - 92)  BP: 146/68 (25 Sep 2018 05:18) (126/61 - 146/68)  BP(mean): --  RR: 16 (25 Sep 2018 05:18) (16 - 18)  SpO2: --    PHYSICAL EXAM:  GENERAL: NAD, well-groomed, well-developed  HEAD:  Atraumatic, Normocephalic  EYES: EOMI, PERRLA, conjunctiva and sclera clear  ENMT: No tonsillar erythema, exudates, or enlargement; Moist mucous membranes, Good dentition, No lesions  NECK: Supple, No JVD, Normal thyroid  NERVOUS SYSTEM:  Alert & Oriented X2 (person and place)   PULM: Clear to auscultation bilaterally  CARDIAC: Regular rate and rhythm; No murmurs, rubs, or gallops  GI: Soft, Nontender, Nondistended; Bowel sounds present  EXTREMITIES:  2+ Peripheral Pulses, No clubbing, cyanosis, or edema  LYMPH: No lymphadenopathy noted  SKIN: No rashes or lesions    Consultant(s) Notes Reviewed:  [x ] YES  [ ] NO  Care Discussed with Consultants/Other Providers [ x] YES  [ ] NO    LABS:                          12.5   6.19  )-----------( 184      ( 23 Sep 2018 17:30 )             39.0     09-23    141  |  100  |  22<H>  ----------------------------<  93  3.5   |  26  |  1.1    Ca    9.7      23 Sep 2018 17:30    TPro  6.8  /  Alb  4.2  /  TBili  1.7<H>  /  DBili  x   /  AST  33  /  ALT  16  /  AlkPhos  50  09-23      haloperidol     Tablet 1 milliGRAM(s) Oral at bedtime PRN  heparin  Injectable 5000 Unit(s) SubCutaneous every 8 hours  hydrochlorothiazide 12.5 milliGRAM(s) Oral daily  influenza   Vaccine 0.5 milliLiter(s) IntraMuscular once  lisinopril 10 milliGRAM(s) Oral daily      HEALTH ISSUES - PROBLEM Dx:  Erythema of lower extremity: Erythema of lower extremity  Hypertension: Hypertension  Dementia: Dementia      MEDICATIONS  (STANDING):  heparin  Injectable 5000 Unit(s) SubCutaneous every 8 hours  hydrochlorothiazide 12.5 milliGRAM(s) Oral daily  influenza   Vaccine 0.5 milliLiter(s) IntraMuscular once  lisinopril 10 milliGRAM(s) Oral daily    MEDICATIONS  (PRN):  haloperidol     Tablet 1 milliGRAM(s) Oral at bedtime PRN joe
RAGINI MARCUS  77y  [facility] [address] [place] [address]      Patient is a 77y old  Female who presents with a chief complaint of agitation and confusion (23 Sep 2018 22:33)      INTERVAL HPI/OVERNIGHT EVENTS:        REVIEW OF SYSTEMS:  denies headache , nausea, vomitting, chest pain, shortness of breath, abdominal pain, weakness, loss in apettite,, vision loss,  FAMILY HISTORY:  Family history of breast cancer in sister (Sibling)    T(C): 36.1 (09-24-18 @ 06:12), Max: 36.4 (09-23-18 @ 22:55)  HR: 102 (09-24-18 @ 06:12) (73 - 102)  BP: 135/63 (09-24-18 @ 06:12) (132/70 - 162/70)  RR: 18 (09-24-18 @ 06:12) (16 - 20)  SpO2: 96% (09-23-18 @ 22:55) (96% - 97%)  Wt(kg): --Vital Signs Last 24 Hrs  T(C): 36.1 (24 Sep 2018 06:12), Max: 36.4 (23 Sep 2018 22:55)  T(F): 97 (24 Sep 2018 06:12), Max: 97.5 (23 Sep 2018 22:55)  HR: 102 (24 Sep 2018 06:12) (73 - 102)  BP: 135/63 (24 Sep 2018 06:12) (132/70 - 162/70)  BP(mean): --  RR: 18 (24 Sep 2018 06:12) (16 - 20)  SpO2: 96% (23 Sep 2018 22:55) (96% - 97%)    PHYSICAL EXAM:  GENERAL: NAD, well-groomed, well-developed  HEAD:  Atraumatic, Normocephalic  EYES: EOMI, PERRLA, conjunctiva and sclera clear  ENMT: No tonsillar erythema, exudates, or enlargement; Moist mucous membranes, Good dentition, No lesions  NECK: Supple, No JVD, Normal thyroid  NERVOUS SYSTEM:  Alert & Oriented X1 (person)   PULM: Clear to auscultation bilaterally  CARDIAC: Regular rate and rhythm; No murmurs, rubs, or gallops  GI: Soft, Nontender, Nondistended; Bowel sounds present  EXTREMITIES:  2+ Peripheral Pulses, No clubbing, cyanosis, or edema  LYMPH: No lymphadenopathy noted  SKIN: No rashes or lesions    Consultant(s) Notes Reviewed:  [x ] YES  [ ] NO  Care Discussed with Consultants/Other Providers [ x] YES  [ ] NO    LABS:                            12.5   6.19  )-----------( 184      ( 23 Sep 2018 17:30 )             39.0   09-23    141  |  100  |  22<H>  ----------------------------<  93  3.5   |  26  |  1.1    Ca    9.7      23 Sep 2018 17:30    TPro  6.8  /  Alb  4.2  /  TBili  1.7<H>  /  DBili  x   /  AST  33  /  ALT  16  /  AlkPhos  50  09-23            haloperidol     Tablet 1 milliGRAM(s) Oral at bedtime PRN  heparin  Injectable 5000 Unit(s) SubCutaneous every 8 hours  hydrochlorothiazide 12.5 milliGRAM(s) Oral daily  influenza   Vaccine 0.5 milliLiter(s) IntraMuscular once  lisinopril 10 milliGRAM(s) Oral daily      HEALTH ISSUES - PROBLEM Dx:  Erythema of lower extremity: Erythema of lower extremity  Hypertension: Hypertension  Dementia: Dementia          Case Discussed with House Staff   45 minutes spent on total encounter; more than 50% of the visit was spent counseling and/or coordinating care by the attending physician.
RAGINI MARCUS  77y  [facility] [address] [place] [address]      Patient is a 77y old Female who presents with a chief complaint of agitation and confusion (23 Sep 2018 22:33)      INTERVAL HPI/OVERNIGHT EVENTS:  Patient's room moved closer to the nurses station        REVIEW OF SYSTEMS:  denies headache , nausea, vomiting chest pain, shortness of breath, abdominal pain, weakness, loss in appetite vision loss    FAMILY HISTORY:  Family history of breast cancer in sister (Sibling)    Vital Signs Last 24 Hrs  T(C): 35.9 (26 Sep 2018 06:00), Max: 36 (25 Sep 2018 21:30)  T(F): 96.6 (26 Sep 2018 06:00), Max: 96.8 (25 Sep 2018 21:30)  HR: 83 (26 Sep 2018 06:00) (83 - 89)  BP: 154/70 (26 Sep 2018 06:00) (154/70 - 156/74)  BP(mean): --  RR: 16 (26 Sep 2018 06:00) (16 - 16)  SpO2: --      PHYSICAL EXAM:  GENERAL: NAD, well-groomed, well-developed  HEAD:  Atraumatic, Normocephalic  EYES: EOMI, PERRLA, conjunctiva and sclera clear  ENMT: No tonsillar erythema, exudates, or enlargement; Moist mucous membranes, Good dentition, No lesions  NECK: Supple, No JVD, Normal thyroid  NERVOUS SYSTEM:  Alert & Oriented X2 (person and place)   PULM: Clear to auscultation bilaterally  CARDIAC: Regular rate and rhythm; No murmurs, rubs, or gallops  GI: Soft, Nontender, Nondistended; Bowel sounds present, obese  EXTREMITIES:  2+ Peripheral Pulses, No clubbing, cyanosis, or edema  LYMPH: No lymphadenopathy noted  SKIN: No rashes or lesions    Consultant(s) Notes Reviewed:  [x ] YES  [ ] NO  Care Discussed with Consultants/Other Providers [ x] YES  [ ] NO    LABS:                          12.5   6.19  )-----------( 184      ( 23 Sep 2018 17:30 )             39.0     09-23    141  |  100  |  22<H>  ----------------------------<  93  3.5   |  26  |  1.1    Ca    9.7      23 Sep 2018 17:30    TPro  6.8  /  Alb  4.2  /  TBili  1.7<H>  /  DBili  x   /  AST  33  /  ALT  16  /  AlkPhos  50  09-23      HEALTH ISSUES - PROBLEM Dx:  Erythema of lower extremity: Erythema of lower extremity  Hypertension: Hypertension  Dementia: Dementia      MEDICATIONS  (STANDING):  heparin  Injectable 5000 Unit(s) SubCutaneous every 8 hours  hydrochlorothiazide 12.5 milliGRAM(s) Oral daily  influenza   Vaccine 0.5 milliLiter(s) IntraMuscular once  lisinopril 10 milliGRAM(s) Oral daily    MEDICATIONS  (PRN):  haloperidol     Tablet 0.5 milliGRAM(s) Oral two times a day PRN anxiety
RAGINI MARCUS  77y  [facility]-[address] [place] [address]      Patient is a 77y old Female who presents with a chief complaint of agitation and confusion (23 Sep 2018 22:33)      INTERVAL HPI/OVERNIGHT EVENTS:  no complaints  patient is calm and cooperative        REVIEW OF SYSTEMS:  denies headache , nausea, vomiting chest pain, shortness of breath, abdominal pain, weakness, loss in appetite vision loss    FAMILY HISTORY:  Family history of breast cancer in sister (Sibling)    Vital Signs Last 24 Hrs  T(C): 35.9 (27 Sep 2018 06:00), Max: 36.4 (26 Sep 2018 14:11)  T(F): 96.7 (27 Sep 2018 06:00), Max: 97.6 (26 Sep 2018 14:11)  HR: 80 (27 Sep 2018 06:00) (79 - 93)  BP: 115/57 (27 Sep 2018 06:00) (83/47 - 149/72)  BP(mean): --  RR: 16 (27 Sep 2018 06:00) (16 - 18)  SpO2: --      PHYSICAL EXAM:  GENERAL: NAD, well-groomed, well-developed  HEAD:  Atraumatic, Normocephalic  EYES: EOMI, PERRLA, conjunctiva and sclera clear  ENMT: No tonsillar erythema, exudates, or enlargement; Moist mucous membranes, Good dentition, No lesions  NECK: Supple, No JVD, Normal thyroid  NERVOUS SYSTEM:  Alert & Oriented X2 (person and place)   PULM: Clear to auscultation bilaterally  CARDIAC: Regular rate and rhythm; No murmurs, rubs, or gallops  GI: Soft, Nontender, Nondistended; Bowel sounds present, obese  EXTREMITIES:  2+ Peripheral Pulses, No clubbing, cyanosis, or edema  LYMPH: No lymphadenopathy noted  SKIN: No rashes or lesions    Consultant(s) Notes Reviewed:  [x ] YES  [ ] NO  Care Discussed with Consultants/Other Providers [ x] YES  [ ] NO    LABS:  no new labs                        12.5   6.19  )-----------( 184      ( 23 Sep 2018 17:30 )             39.0     09-23    141  |  100  |  22<H>  ----------------------------<  93  3.5   |  26  |  1.1    Ca    9.7      23 Sep 2018 17:30    TPro  6.8  /  Alb  4.2  /  TBili  1.7<H>  /  DBili  x   /  AST  33  /  ALT  16  /  AlkPhos  50  09-23      HEALTH ISSUES - PROBLEM Dx:  Erythema of lower extremity: Erythema of lower extremity  Hypertension: Hypertension  Dementia: Dementia      MEDICATIONS  (STANDING):  heparin  Injectable 5000 Unit(s) SubCutaneous every 8 hours  hydrochlorothiazide 12.5 milliGRAM(s) Oral daily  influenza   Vaccine 0.5 milliLiter(s) IntraMuscular once  lisinopril 10 milliGRAM(s) Oral daily    MEDICATIONS  (PRN):  haloperidol     Tablet 0.5 milliGRAM(s) Oral two times a day PRN anxiety
reviewed and referred to chart notes and prior consult.    Pt is observed in bed, laying peacefully in bed, Not agitated or aggressive at this time. no episodes of agitation. she is not oriented to date, situation, length of stay in hospital, the fact that she was in the hospital, when asked, stated that she lives with "my parents", was able to state her name and  indicative of severe global cognitive deficit. confusion appears to be secondary to dementia than to delirium since her alertness is intact.

## 2018-09-27 NOTE — PROGRESS NOTE ADULT - PROBLEM SELECTOR PLAN 1
with behavior disturbance, psych consult appreciated - off 1:1 sit  will d/c to snf
with behavior disturbance, psych consult for medication adjustment
with behavior disturbance, psych consult appreciated - off 1:1 sit  will d/c to snf when bed available
with behavior disturbance, psych consult appreciated - off 1:1 sit  will d/c to snf when bed available
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
## 2018-09-29 ENCOUNTER — OUTPATIENT (OUTPATIENT)
Dept: OUTPATIENT SERVICES | Facility: HOSPITAL | Age: 78
LOS: 1 days | Discharge: HOME | End: 2018-09-29

## 2018-09-29 DIAGNOSIS — S79.919A UNSPECIFIED INJURY OF UNSPECIFIED HIP, INITIAL ENCOUNTER: Chronic | ICD-10-CM

## 2018-09-29 DIAGNOSIS — I10 ESSENTIAL (PRIMARY) HYPERTENSION: ICD-10-CM

## 2018-09-29 DIAGNOSIS — F03.91 UNSPECIFIED DEMENTIA WITH BEHAVIORAL DISTURBANCE: ICD-10-CM

## 2018-09-29 DIAGNOSIS — Z98.890 OTHER SPECIFIED POSTPROCEDURAL STATES: Chronic | ICD-10-CM

## 2018-10-01 DIAGNOSIS — F03.91 UNSPECIFIED DEMENTIA WITH BEHAVIORAL DISTURBANCE: ICD-10-CM

## 2018-10-01 DIAGNOSIS — Z98.890 OTHER SPECIFIED POSTPROCEDURAL STATES: ICD-10-CM

## 2018-10-01 DIAGNOSIS — R45.1 RESTLESSNESS AND AGITATION: ICD-10-CM

## 2018-10-01 DIAGNOSIS — Z80.3 FAMILY HISTORY OF MALIGNANT NEOPLASM OF BREAST: ICD-10-CM

## 2018-10-01 DIAGNOSIS — Z88.8 ALLERGY STATUS TO OTHER DRUGS, MEDICAMENTS AND BIOLOGICAL SUBSTANCES STATUS: ICD-10-CM

## 2018-10-01 DIAGNOSIS — Z88.0 ALLERGY STATUS TO PENICILLIN: ICD-10-CM

## 2018-10-01 DIAGNOSIS — I10 ESSENTIAL (PRIMARY) HYPERTENSION: ICD-10-CM

## 2018-10-01 DIAGNOSIS — L53.9 ERYTHEMATOUS CONDITION, UNSPECIFIED: ICD-10-CM

## 2019-01-05 ENCOUNTER — OUTPATIENT (OUTPATIENT)
Dept: OUTPATIENT SERVICES | Facility: HOSPITAL | Age: 79
LOS: 1 days | Discharge: HOME | End: 2019-01-05

## 2019-01-05 DIAGNOSIS — I10 ESSENTIAL (PRIMARY) HYPERTENSION: ICD-10-CM

## 2019-01-05 DIAGNOSIS — Z98.890 OTHER SPECIFIED POSTPROCEDURAL STATES: Chronic | ICD-10-CM

## 2019-01-05 DIAGNOSIS — S79.919A UNSPECIFIED INJURY OF UNSPECIFIED HIP, INITIAL ENCOUNTER: Chronic | ICD-10-CM

## 2019-05-18 ENCOUNTER — OUTPATIENT (OUTPATIENT)
Dept: OUTPATIENT SERVICES | Facility: HOSPITAL | Age: 79
LOS: 1 days | Discharge: HOME | End: 2019-05-18

## 2019-05-18 DIAGNOSIS — S79.919A UNSPECIFIED INJURY OF UNSPECIFIED HIP, INITIAL ENCOUNTER: Chronic | ICD-10-CM

## 2019-05-18 DIAGNOSIS — Z98.890 OTHER SPECIFIED POSTPROCEDURAL STATES: Chronic | ICD-10-CM

## 2019-05-18 DIAGNOSIS — I10 ESSENTIAL (PRIMARY) HYPERTENSION: ICD-10-CM

## 2019-07-08 ENCOUNTER — OUTPATIENT (OUTPATIENT)
Dept: OUTPATIENT SERVICES | Facility: HOSPITAL | Age: 79
LOS: 1 days | Discharge: HOME | End: 2019-07-08

## 2019-07-08 DIAGNOSIS — R50.9 FEVER, UNSPECIFIED: ICD-10-CM

## 2019-07-08 DIAGNOSIS — Z98.890 OTHER SPECIFIED POSTPROCEDURAL STATES: Chronic | ICD-10-CM

## 2019-07-08 DIAGNOSIS — S79.919A UNSPECIFIED INJURY OF UNSPECIFIED HIP, INITIAL ENCOUNTER: Chronic | ICD-10-CM

## 2019-07-11 ENCOUNTER — OUTPATIENT (OUTPATIENT)
Dept: OUTPATIENT SERVICES | Facility: HOSPITAL | Age: 79
LOS: 1 days | Discharge: HOME | End: 2019-07-11

## 2019-07-11 DIAGNOSIS — N39.0 URINARY TRACT INFECTION, SITE NOT SPECIFIED: ICD-10-CM

## 2019-07-11 DIAGNOSIS — S79.919A UNSPECIFIED INJURY OF UNSPECIFIED HIP, INITIAL ENCOUNTER: Chronic | ICD-10-CM

## 2019-07-11 DIAGNOSIS — Z98.890 OTHER SPECIFIED POSTPROCEDURAL STATES: Chronic | ICD-10-CM

## 2019-07-11 DIAGNOSIS — R50.9 FEVER, UNSPECIFIED: ICD-10-CM

## 2019-07-13 ENCOUNTER — OUTPATIENT (OUTPATIENT)
Dept: OUTPATIENT SERVICES | Facility: HOSPITAL | Age: 79
LOS: 1 days | Discharge: HOME | End: 2019-07-13

## 2019-07-13 DIAGNOSIS — S79.919A UNSPECIFIED INJURY OF UNSPECIFIED HIP, INITIAL ENCOUNTER: Chronic | ICD-10-CM

## 2019-07-13 DIAGNOSIS — R50.9 FEVER, UNSPECIFIED: ICD-10-CM

## 2019-07-13 DIAGNOSIS — Z98.890 OTHER SPECIFIED POSTPROCEDURAL STATES: Chronic | ICD-10-CM

## 2019-07-13 DIAGNOSIS — N39.0 URINARY TRACT INFECTION, SITE NOT SPECIFIED: ICD-10-CM

## 2019-09-03 ENCOUNTER — OUTPATIENT (OUTPATIENT)
Dept: OUTPATIENT SERVICES | Facility: HOSPITAL | Age: 79
LOS: 1 days | Discharge: HOME | End: 2019-09-03

## 2019-09-03 DIAGNOSIS — I10 ESSENTIAL (PRIMARY) HYPERTENSION: ICD-10-CM

## 2019-09-03 DIAGNOSIS — Z98.890 OTHER SPECIFIED POSTPROCEDURAL STATES: Chronic | ICD-10-CM

## 2019-09-03 DIAGNOSIS — S79.919A UNSPECIFIED INJURY OF UNSPECIFIED HIP, INITIAL ENCOUNTER: Chronic | ICD-10-CM

## 2019-11-24 ENCOUNTER — OUTPATIENT (OUTPATIENT)
Dept: OUTPATIENT SERVICES | Facility: HOSPITAL | Age: 79
LOS: 1 days | Discharge: HOME | End: 2019-11-24

## 2019-11-24 DIAGNOSIS — S79.919A UNSPECIFIED INJURY OF UNSPECIFIED HIP, INITIAL ENCOUNTER: Chronic | ICD-10-CM

## 2019-11-24 DIAGNOSIS — Z98.890 OTHER SPECIFIED POSTPROCEDURAL STATES: Chronic | ICD-10-CM

## 2019-11-24 DIAGNOSIS — R50.9 FEVER, UNSPECIFIED: ICD-10-CM

## 2019-11-26 ENCOUNTER — OUTPATIENT (OUTPATIENT)
Dept: OUTPATIENT SERVICES | Facility: HOSPITAL | Age: 79
LOS: 1 days | Discharge: HOME | End: 2019-11-26

## 2019-11-26 DIAGNOSIS — Z98.890 OTHER SPECIFIED POSTPROCEDURAL STATES: Chronic | ICD-10-CM

## 2019-11-26 DIAGNOSIS — I73.9 PERIPHERAL VASCULAR DISEASE, UNSPECIFIED: ICD-10-CM

## 2019-11-26 DIAGNOSIS — S79.919A UNSPECIFIED INJURY OF UNSPECIFIED HIP, INITIAL ENCOUNTER: Chronic | ICD-10-CM

## 2019-11-27 ENCOUNTER — OUTPATIENT (OUTPATIENT)
Dept: OUTPATIENT SERVICES | Facility: HOSPITAL | Age: 79
LOS: 1 days | Discharge: HOME | End: 2019-11-27

## 2019-11-27 DIAGNOSIS — S79.919A UNSPECIFIED INJURY OF UNSPECIFIED HIP, INITIAL ENCOUNTER: Chronic | ICD-10-CM

## 2019-11-27 DIAGNOSIS — I73.9 PERIPHERAL VASCULAR DISEASE, UNSPECIFIED: ICD-10-CM

## 2019-11-27 DIAGNOSIS — Z98.890 OTHER SPECIFIED POSTPROCEDURAL STATES: Chronic | ICD-10-CM

## 2019-12-14 ENCOUNTER — OUTPATIENT (OUTPATIENT)
Dept: OUTPATIENT SERVICES | Facility: HOSPITAL | Age: 79
LOS: 1 days | Discharge: HOME | End: 2019-12-14

## 2019-12-14 DIAGNOSIS — I10 ESSENTIAL (PRIMARY) HYPERTENSION: ICD-10-CM

## 2019-12-14 DIAGNOSIS — S79.919A UNSPECIFIED INJURY OF UNSPECIFIED HIP, INITIAL ENCOUNTER: Chronic | ICD-10-CM

## 2019-12-14 DIAGNOSIS — Z98.890 OTHER SPECIFIED POSTPROCEDURAL STATES: Chronic | ICD-10-CM

## 2019-12-26 ENCOUNTER — OUTPATIENT (OUTPATIENT)
Dept: OUTPATIENT SERVICES | Facility: HOSPITAL | Age: 79
LOS: 1 days | Discharge: HOME | End: 2019-12-26

## 2019-12-26 DIAGNOSIS — Z98.890 OTHER SPECIFIED POSTPROCEDURAL STATES: Chronic | ICD-10-CM

## 2019-12-26 DIAGNOSIS — I10 ESSENTIAL (PRIMARY) HYPERTENSION: ICD-10-CM

## 2019-12-26 DIAGNOSIS — S79.919A UNSPECIFIED INJURY OF UNSPECIFIED HIP, INITIAL ENCOUNTER: Chronic | ICD-10-CM

## 2020-02-27 NOTE — H&P ADULT - NSTOBACCOSCREENHP_GEN_A_NCS
No
Home
Rotation Flap Text: The defect edges were debeveled with a #15 scalpel blade.  Given the location of the defect, shape of the defect and the proximity to free margins a rotation flap was deemed most appropriate.  Using a sterile surgical marker, an appropriate rotation flap was drawn incorporating the defect and placing the expected incisions within the relaxed skin tension lines where possible.    The area thus outlined was incised deep to adipose tissue with a #15 scalpel blade.  The skin margins were undermined to an appropriate distance in all directions utilizing iris scissors.

## 2021-09-07 PROBLEM — Z00.00 ENCOUNTER FOR PREVENTIVE HEALTH EXAMINATION: Status: ACTIVE | Noted: 2021-01-01

## 2021-11-11 ENCOUNTER — APPOINTMENT (OUTPATIENT)
Dept: GASTROENTEROLOGY | Facility: CLINIC | Age: 81
End: 2021-11-11

## 2021-12-23 ENCOUNTER — APPOINTMENT (OUTPATIENT)
Dept: GASTROENTEROLOGY | Facility: CLINIC | Age: 81
End: 2021-12-23
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
## 2022-06-06 NOTE — DISCHARGE NOTE ADULT - NS MD DC FALL RISK RISK
Responded to pt message via [facility]  Advised pt to scheduled OV with PCP   For information on Fall & Injury Prevention, visit [email or web address]

## 2023-12-25 NOTE — PATIENT PROFILE ADULT. - ABILITY TO HEAR (WITH HEARING AID OR HEARING APPLIANCE IF NORMALLY USED):
Adequate: hears normal conversation without difficulty <-- Click to add NO significant Past Surgical History